# Patient Record
Sex: FEMALE | Race: WHITE | NOT HISPANIC OR LATINO | Employment: UNEMPLOYED | URBAN - METROPOLITAN AREA
[De-identification: names, ages, dates, MRNs, and addresses within clinical notes are randomized per-mention and may not be internally consistent; named-entity substitution may affect disease eponyms.]

---
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
## 2019-01-14 ENCOUNTER — OFFICE VISIT (OUTPATIENT)
Dept: URGENT CARE | Facility: CLINIC | Age: 19
End: 2019-01-14
Payer: COMMERCIAL

## 2019-01-14 VITALS
BODY MASS INDEX: 39.68 KG/M2 | OXYGEN SATURATION: 98 % | RESPIRATION RATE: 20 BRPM | HEART RATE: 89 BPM | HEIGHT: 72 IN | TEMPERATURE: 99 F | WEIGHT: 293 LBS

## 2019-01-14 DIAGNOSIS — K52.9 GASTROENTERITIS: Primary | ICD-10-CM

## 2019-01-14 PROCEDURE — 3008F BODY MASS INDEX DOCD: CPT | Mod: CPTII,S$GLB,, | Performed by: FAMILY MEDICINE

## 2019-01-14 PROCEDURE — 99203 PR OFFICE/OUTPT VISIT, NEW, LEVL III, 30-44 MIN: ICD-10-PCS | Mod: S$GLB,,, | Performed by: FAMILY MEDICINE

## 2019-01-14 PROCEDURE — 99203 OFFICE O/P NEW LOW 30 MIN: CPT | Mod: S$GLB,,, | Performed by: FAMILY MEDICINE

## 2019-01-14 PROCEDURE — 3008F PR BODY MASS INDEX (BMI) DOCUMENTED: ICD-10-PCS | Mod: CPTII,S$GLB,, | Performed by: FAMILY MEDICINE

## 2019-01-14 RX ORDER — CITALOPRAM 20 MG/1
20 TABLET, FILM COATED ORAL DAILY
Refills: 5 | COMMUNITY
Start: 2019-01-10 | End: 2020-02-10 | Stop reason: CLARIF

## 2019-01-14 RX ORDER — DIPHENOXYLATE HYDROCHLORIDE AND ATROPINE SULFATE 2.5; .025 MG/1; MG/1
1 TABLET ORAL 4 TIMES DAILY PRN
Qty: 20 TABLET | Refills: 0 | Status: SHIPPED | OUTPATIENT
Start: 2019-01-14 | End: 2020-02-10 | Stop reason: CLARIF

## 2019-01-14 RX ORDER — PROMETHAZINE HYDROCHLORIDE 25 MG/1
25 TABLET ORAL EVERY 6 HOURS PRN
Qty: 20 TABLET | Refills: 0 | Status: SHIPPED | OUTPATIENT
Start: 2019-01-14 | End: 2020-02-10 | Stop reason: CLARIF

## 2019-01-14 RX ORDER — PROMETHAZINE HYDROCHLORIDE 25 MG/1
25 SUPPOSITORY RECTAL EVERY 6 HOURS PRN
Qty: 5 SUPPOSITORY | Refills: 0 | Status: SHIPPED | OUTPATIENT
Start: 2019-01-14 | End: 2020-02-10 | Stop reason: CLARIF

## 2019-01-14 NOTE — LETTER
January 14, 2019  Pedro Hidalgo  [address]  [place] [address]                [facility] [place]  [address], [address]  [place] [address]  Phone: [phone]  Fax: [phone] Pedro Hidalgo was seen and treated in our Urgent Care department   on 1/14/2019. She may return to work in 2 - 3 days.      If you have any questions or concerns, please don't hesitate to call.    Sincerely,        Juan Pablo Conteh MD

## 2019-01-15 NOTE — PROGRESS NOTES
"Subjective:       Patient ID: Pedro Hidalgo is a 18 y.o. female.    Vitals:  height is 6' 1" (1.854 m) and weight is 164.2 kg (362 lb) (abnormal). Her oral temperature is 98.7 °F (37.1 °C). Her pulse is 89. Her respiration is 20 and oxygen saturation is 98%.     Chief Complaint: Abdominal Pain    Abdominal Pain   This is a new problem. The current episode started yesterday. The onset quality is sudden. The problem occurs constantly. The problem has been gradually worsening. The pain is located in the periumbilical region and suprapubic region. The pain is at a severity of 6/10. The pain is moderate. The quality of the pain is aching. The abdominal pain does not radiate. Associated symptoms include diarrhea, nausea and vomiting. Pertinent negatives include no constipation, dysuria or fever. Nothing aggravates the pain. The pain is relieved by nothing. She has tried nothing for the symptoms. There is no history of abdominal surgery.       Constitution: Negative for appetite change, chills, sweating and fever.   HENT: Negative for trouble swallowing.    Cardiovascular: Negative for chest pain.   Respiratory: Negative for shortness of breath.    Gastrointestinal: Positive for abdominal pain, nausea, vomiting and diarrhea. Negative for abdominal trauma, abdominal bloating, history of abdominal surgery, constipation, dark colored stools and heartburn.   Genitourinary: Negative for dysuria, missed menses and pelvic pain.   Musculoskeletal: Negative for back pain.       Objective:      Physical Exam   Constitutional: She is oriented to person, place, and time. She appears well-developed and well-nourished.   HENT:   Head: Normocephalic and atraumatic.   Right Ear: External ear normal.   Left Ear: External ear normal.   Nose: Nose normal.   Mouth/Throat: Mucous membranes are normal.   Eyes: Conjunctivae and lids are normal.   Neck: Trachea normal and full passive range of motion without pain. Neck supple. "   Cardiovascular: Normal rate, regular rhythm and normal heart sounds.   Pulmonary/Chest: Effort normal and breath sounds normal. No respiratory distress.   Abdominal: Soft. Normal appearance and bowel sounds are normal. She exhibits no distension, no abdominal bruit, no pulsatile midline mass and no mass. There is no tenderness.   Musculoskeletal: Normal range of motion. She exhibits no edema.   Neurological: She is alert and oriented to person, place, and time. She has normal strength.   Skin: Skin is warm, dry and intact. She is not diaphoretic. No pallor.   Psychiatric: She has a normal mood and affect. Her speech is normal and behavior is normal. Judgment and thought content normal. Cognition and memory are normal.   Nursing note and vitals reviewed.      Assessment:       1. Gastroenteritis        Plan:         Gastroenteritis  -     promethazine (PHENERGAN) 25 MG suppository; Place 1 suppository (25 mg total) rectally every 6 (six) hours as needed for Nausea.  Dispense: 5 suppository; Refill: 0  -     promethazine (PHENERGAN) 25 MG tablet; Take 1 tablet (25 mg total) by mouth every 6 (six) hours as needed for Nausea.  Dispense: 20 tablet; Refill: 0  -     diphenoxylate-atropine 2.5-0.025 mg (LOMOTIL) 2.5-0.025 mg per tablet; Take 1 tablet by mouth 4 (four) times daily as needed for Diarrhea.  Dispense: 20 tablet; Refill: 0    Please drink plenty of fluids.  Please get plenty of rest.  Clear liquid diet as instructed for 2 - 3 days or until symptoms improve.  Please return here or go to the Emergency Department for any concerns or worsening of condition.  If you were prescribed antibiotics, please take them to completion.  If not allergic, please take over the counter Tylenol (Acetaminophen) as directed for control of pain and/or fever.  Motrin (advil) or Alleve may help a fever, but they may also worsen your Nausea and Vomiting.    Please follow up with your primary care doctor or specialist as  needed.    Primary Doctor No  None    If you  smoke, please stop smoking.

## 2019-01-15 NOTE — PATIENT INSTRUCTIONS
Please drink plenty of fluids.  Please get plenty of rest.  Clear liquid diet as instructed for 2 - 3 days or until symptoms improve.  Please return here or go to the Emergency Department for any concerns or worsening of condition.  If you were prescribed antibiotics, please take them to completion.  If not allergic, please take over the counter Tylenol (Acetaminophen) as directed for control of pain and/or fever.  Motrin (advil) or Alleve may help a fever, but they may also worsen your Nausea and Vomiting.    Please follow up with your primary care doctor or specialist as needed.    Primary Doctor No  None    If you  smoke, please stop smoking.      Clear Liquid Diet    Clear liquids are any liquid that you can see through. They are also very easy to digest. You may be put on a clear liquid diet if you are recovering from irritation or infection of the stomach or intestinal tract. This diet may also be used before surgery or special procedures such as a colonoscopy. You should not be on this diet for more than 3 days. Below are some clear liquids you can have on this diet.  Adults and children over 2 years old  Adults should drink a total of 2 to 3 quarts of liquid per day. It may be easier to drink small frequent servings rather than a few large ones. Liquids can include:  · Fruit juices. Strained orange juice or lemonade (no pulp); apple, grape, and cranberry juice; clear fruit drinks  · Beverages. Sport drinks, sodas, mineral water (plain or flavored), tea, black coffee, liquid gelatin (add twice the recommended amount of water)  · Soups. Clear broth  · Desserts. Plain gelatin, popsicles, fruit juice bars  Children under 2 years old  Oral rehydration fluids are available at drug stores and most grocery stores. You dont need a prescription.  Date Last Reviewed: 8/1/2016  © 9911-1709 [employer]. [address] [place] [address], [place] [address]. All rights reserved. This information is not intended as a  substitute for professional medical care. Always follow your healthcare professional's instructions.      Viral Gastroenteritis (Adult)    Gastroenteritis is commonly called the stomach flu. It is most often caused by a virus that affects the stomach and intestinal tract and usually lasts from 2 to 7 days. Common viruses causing gastroenteritis include norovirus, rotavirus, and hepatitis A. Non-viral causes of gastroenteritis include bacteria, parasites, and toxins.  The danger from repeated vomiting or diarrhea is dehydration. This is the loss of too much fluid from the body. When this occurs, body fluids must be replaced. Antibiotics do not help with this illness because it is usually viral.Simple home treatment will be helpful.  Symptoms of viral gastroenteritis may include:  · Watery, loose stools  · Stomach pain or abdominal cramps  · Fever and chills  · Nausea and vomiting  · Loss of bowel control  · Headache  Home care  Gastroenteritis is transmitted by contact with the stool or vomit of an infected person. This can occur from person to person or from contact with a contaminated surface.  Follow these guidelines when caring for yourself at home:  · If symptoms are severe, rest at home for the next 24 hours or until you are feeling better.  · Wash your hands with soap and water or use alcohol-based  to prevent the spread of infection. Wash your hands after touching anyone who is sick.  · Wash your hands or use alcohol-based  after using the toilet and before meals. Clean the toilet after each use.  Remember these tips when preparing food:  · People with diarrhea should not prepare or serve food to others. When preparing foods, wash your hands before and after.  · Wash your hands after using cutting boards, countertops, knives, or utensils that have been in contact with raw food.  · Keep uncooked meats away from cooked and ready-to-eat foods.  Medicine  You may use acetaminophen or NSAID  medicines like ibuprofen or naproxen to control fever unless another medicine was given. If you have chronic liver or kidney disease, talk with your healthcare provider before using these medicines. Also talk with your provider if you've had a stomach ulcer or gastrointestinal bleeding. Don't give aspirin to anyone under 18 years of age who is ill with a fever. It may cause severe liver damage. Don't use NSAIDS is you are already taking one for another condition (like arthritis) or are on aspirin (such as for heart disease or after a stroke).  If medicine for vomiting or diarrhea are prescribed, take these only as directed. Do not take over-the-counter medicines for vomiting or diarrhea unless instructed by your healthcare provider.  Diet  Follow these guidelines for food:  · Water and liquids are important so you don't get dehydrated. Drink a small amount at a time or suck on ice chips if you are vomiting.  · If you eat, avoid fatty, greasy, spicy, or fried foods.  · Don't eat dairy if you have diarrhea. This can make diarrhea worse.  · Avoid tobacco, alcohol, and caffeine which may worsen symptoms.  During the first 24 hours (the first full day), follow the diet below:  · Beverages. Sports drinks, soft drinks without caffeine, ginger ale, mineral water (plain or flavored), decaffeinated tea and coffee. If you are very dehydrated, sports drinks aren't a good choice. They have too much sugar and not enough electrolytes. In this case, commercially available products called oral rehydration solutions, are best.  · Soups. Eat clear broth, consommé, and bouillon.  · Desserts. Eat gelatin, popsicles, and fruit juice bars.  During the next 24 hours (the second day), you may add the following to the above:  · Hot cereal, plain toast, bread, rolls, and crackers  · Plain noodles, rice, mashed potatoes, chicken noodle or rice soup  · Unsweetened canned fruit (avoid pineapple), bananas  · Limit fat intake to less than 15 grams  per day. Do this by avoiding margarine, butter, oils, mayonnaise, sauces, gravies, fried foods, peanut butter, meat, poultry, and fish.  · Limit fiber and avoid raw or cooked vegetables, fresh fruits (except bananas), and bran cereals.  · Limit caffeine and chocolate. Don't use spices or seasonings other than salt.  · Limit dairy products.  · Avoid alcohol.  During the next 24 hours:  · Gradually resume a normal diet as you feel better and your symptoms improve.  · If at any time it starts getting worse again, go back to clear liquids until you feel better.  Follow-up care  Follow up with your healthcare provider, or as advised. Call your provider if you don't get better within 24 hours or if diarrhea lasts more than a week. Also follow up if you are unable to keep down liquids and get dehydrated. If a stool (diarrhea) sample was taken, call as directed for the results.  Call 911  Call 911 if any of these occur:  · Trouble breathing  · Chest pain  · Confused  · Severe drowsiness or trouble awakening  · Fainting or loss of consciousness  · Rapid heart rate  · Seizure  · Stiff neck  When to seek medical advice  Call your healthcare provider right away if any of these occur:  · Abdominal pain that gets worse  · Continued vomiting (unable to keep liquids down)  · Frequent diarrhea (more than 5 times a day)  · Blood in vomit or stool (black or red color)  · Dark urine, reduced urine output, or extreme thirst  · Weakness or dizziness  · Drowsiness  · Fever of 100.4°F (38°C) oral or higher that does not get better with fever medicine  · New rash  Date Last Reviewed: 1/3/2016  © 9742-0389 [employer]. [address] [place] [address], [place] [address]. All rights reserved. This information is not intended as a substitute for professional medical care. Always follow your healthcare professional's instructions.

## 2021-09-15 ENCOUNTER — IMMUNIZATION (OUTPATIENT)
Dept: PRIMARY CARE CLINIC | Facility: CLINIC | Age: 21
End: 2021-09-15

## 2021-09-15 DIAGNOSIS — Z23 NEED FOR VACCINATION: Primary | ICD-10-CM

## 2024-02-20 PROBLEM — Z3A.39 39 WEEKS GESTATION OF PREGNANCY: Status: ACTIVE | Noted: 2024-02-20

## 2025-01-27 PROBLEM — Z3A.39 39 WEEKS GESTATION OF PREGNANCY: Status: RESOLVED | Noted: 2024-02-20 | Resolved: 2025-01-27

## 2025-04-23 ENCOUNTER — OFFICE VISIT (OUTPATIENT)
Dept: URGENT CARE | Facility: CLINIC | Age: 25
End: 2025-04-23
Payer: MEDICAID

## 2025-04-23 VITALS
RESPIRATION RATE: 18 BRPM | DIASTOLIC BLOOD PRESSURE: 83 MMHG | OXYGEN SATURATION: 99 % | TEMPERATURE: 99 F | SYSTOLIC BLOOD PRESSURE: 134 MMHG | HEIGHT: 72 IN | BODY MASS INDEX: 39.68 KG/M2 | WEIGHT: 293 LBS | HEART RATE: 92 BPM

## 2025-04-23 DIAGNOSIS — J02.9 ACUTE PHARYNGITIS, UNSPECIFIED ETIOLOGY: ICD-10-CM

## 2025-04-23 DIAGNOSIS — J01.40 ACUTE NON-RECURRENT PANSINUSITIS: ICD-10-CM

## 2025-04-23 DIAGNOSIS — R52 BODY ACHES: Primary | ICD-10-CM

## 2025-04-23 LAB
CTP QC/QA: YES
POC MOLECULAR INFLUENZA A AGN: NEGATIVE
POC MOLECULAR INFLUENZA B AGN: NEGATIVE

## 2025-04-23 PROCEDURE — 87502 INFLUENZA DNA AMP PROBE: CPT | Mod: QW,S$GLB,, | Performed by: FAMILY MEDICINE

## 2025-04-23 PROCEDURE — 99214 OFFICE O/P EST MOD 30 MIN: CPT | Mod: S$GLB,,, | Performed by: FAMILY MEDICINE

## 2025-04-23 RX ORDER — AMPICILLIN 500 MG/1
500 CAPSULE ORAL 4 TIMES DAILY
Qty: 40 CAPSULE | Refills: 0 | Status: SHIPPED | OUTPATIENT
Start: 2025-04-23

## 2025-04-23 NOTE — LETTER
April 23, 2025  Pedro Strong  [address]  [place] [address]                [facility] [place]  [address]  [place] [address]  Phone: [phone]  Fax: [phone] Pedro Strong was seen and treated in our Urgent Care department on 4/23/2025. She may return to work in 2 - 3 days.      If you have any questions or concerns, please don't hesitate to call.        Sincerely,        Juan Pablo Conteh MD

## 2025-04-23 NOTE — PROGRESS NOTES
"Subjective:      Patient ID: Pedro Strong is a 25 y.o. female.    Vitals:  height is 6' 1" (1.854 m) and weight is 173.7 kg (382 lb 15 oz) (abnormal). Her oral temperature is 99.3 °F (37.4 °C). Her blood pressure is 134/83 and her pulse is 92. Her respiration is 18 and oxygen saturation is 99%.     Chief Complaint: Sinus Problem    Pt states her symptoms started 2 days ago. Pt states she was exposed to the flu.    Sinus Problem  This is a new problem. Episode onset: 2 days. The problem has been gradually worsening since onset. There has been no fever. Her pain is at a severity of 5/10. The pain is moderate. Associated symptoms include headaches and sneezing. Pertinent negatives include no congestion, coughing, ear pain or sore throat. (Fatigue, weakness, body aches ) Past treatments include nothing. The treatment provided no relief.       Constitution: Negative.   HENT:  Negative for ear pain, congestion and sore throat.    Cardiovascular: Negative.    Eyes: Negative.    Respiratory: Negative.  Negative for cough.    Gastrointestinal: Negative.    Endocrine: negative.   Genitourinary: Negative.    Musculoskeletal: Negative.    Skin: Negative.    Allergic/Immunologic: Negative.  Positive for sneezing.   Neurological:  Positive for headaches.   Hematologic/Lymphatic: Negative.    Psychiatric/Behavioral: Negative.        Objective:     Physical Exam   Constitutional: She is oriented to person, place, and time. She appears well-developed. She is cooperative.  Non-toxic appearance. She does not appear ill. No distress.   HENT:   Head: Normocephalic and atraumatic.   Ears:   Right Ear: Hearing, tympanic membrane, external ear and ear canal normal.   Left Ear: Hearing, tympanic membrane, external ear and ear canal normal.   Nose: No mucosal edema, rhinorrhea or nasal deformity. No epistaxis. Right sinus exhibits maxillary sinus tenderness and frontal sinus tenderness. Left sinus exhibits maxillary sinus tenderness " and frontal sinus tenderness.   Mouth/Throat: Uvula is midline and mucous membranes are normal. No trismus in the jaw. Normal dentition. No uvula swelling. Posterior oropharyngeal edema and posterior oropharyngeal erythema present. No oropharyngeal exudate.   Eyes: Conjunctivae and lids are normal. No scleral icterus.   Neck: Trachea normal and phonation normal. Neck supple. No edema present. No erythema present. No neck rigidity present.   Cardiovascular: Normal rate, regular rhythm, normal heart sounds and normal pulses.   Pulmonary/Chest: Effort normal and breath sounds normal. No respiratory distress. She has no decreased breath sounds. She has no rhonchi.   Abdominal: Normal appearance.   Musculoskeletal: Normal range of motion.         General: No deformity or edema. Normal range of motion.   Neurological: She is alert and oriented to person, place, and time. She exhibits normal muscle tone. Coordination normal.   Skin: Skin is warm, dry, intact, not diaphoretic and not pale.   Psychiatric: Her speech is normal and behavior is normal. Judgment and thought content normal.   Nursing note and vitals reviewed.    Results for orders placed or performed in visit on 04/23/25   POCT Influenza A/B MOLECULAR    Collection Time: 04/23/25  6:40 PM   Result Value Ref Range    POC Molecular Influenza A Ag Negative Negative    POC Molecular Influenza B Ag Negative Negative     Acceptable Yes          Assessment:     1. Body aches    2. Acute pharyngitis, unspecified etiology    3. Acute non-recurrent pansinusitis        Plan:       Body aches  -     POCT Influenza A/B MOLECULAR    Acute pharyngitis, unspecified etiology    Acute non-recurrent pansinusitis  -     ampicillin (PRINCIPEN) 500 MG capsule; Take 1 capsule (500 mg total) by mouth 4 (four) times daily.  Dispense: 40 capsule; Refill: 0      Please drink plenty of fluids.  Please get plenty of rest.  Please return here or go to the Emergency Department  for any concerns or worsening of condition.  If you were given wait & see antibiotics, please wait 3-5 days before taking them, and only take them if your symptoms have worsened or not improved.  If you do begin taking the antibiotics, please take them to completion.  If you were prescribed antibiotics, please take them to completion.    Nasal irrigation with a saline spray or Netti Pot like device per their directions is also recommended.  If not allergic, please take over the counter Tylenol (Acetaminophen)  as directed for control of pain and/or fever.  You may take Sudafed every 6 hours as directed for congestion.    Please follow up with your primary care doctor or specialist as needed.    Rodolfo Buckner, JOSE GAUDALUPE  [phone]    If you  smoke, please stop smoking.       You must understand that you have received treatment at an Urgent Care facility only, and that you may be  released before all of your medical problems are known or treated. Urgent Care facilities are not equipped to  handle life threatening emergencies. It is recommended that you seek care at an Emergency Department for  further evaluation of worsening or concerning symptoms, or possibly life threatening conditions as  discussed.

## 2025-04-23 NOTE — PATIENT INSTRUCTIONS
Please drink plenty of fluids.  Please get plenty of rest.  Please return here or go to the Emergency Department for any concerns or worsening of condition.  If you were given wait & see antibiotics, please wait 3-5 days before taking them, and only take them if your symptoms have worsened or not improved.  If you do begin taking the antibiotics, please take them to completion.  If you were prescribed antibiotics, please take them to completion.    Nasal irrigation with a saline spray or Netti Pot like device per their directions is also recommended.  If not allergic, please take over the counter Tylenol (Acetaminophen)  as directed for control of pain and/or fever.  You may take Sudafed every 6 hours as directed for congestion.    Please follow up with your primary care doctor or specialist as needed.    Rodolfo Buckner, JOSE GUADALUPE  [phone]    If you  smoke, please stop smoking.       You must understand that you have received treatment at an Urgent Care facility only, and that you may be  released before all of your medical problems are known or treated. Urgent Care facilities are not equipped to  handle life threatening emergencies. It is recommended that you seek care at an Emergency Department for  further evaluation of worsening or concerning symptoms, or possibly life threatening conditions as  discussed.    Pharyngitis: Strep (Presumed)    You have pharyngitis (sore throat). The cause is thought to be the streptococcus, or strep, bacterium. Strep throat infection can cause throat pain that is worse when swallowing, aching all over, headache, and fever. The infection may be spread by coughing, kissing, or touching others after touching your mouth or nose. Antibiotic medications are given to treat the infection.  Home care  Rest at home. Drink plenty of fluids to avoid dehydration.  No work or school for the first 2 days of taking the antibiotics. After this time, you will not be contagious. You can then return  to work or school if you are feeling better.   The antibiotic medication must be taken for the full 10 days, even if you feel better. This is very important to ensure the infection is treated. It is also important to prevent drug-resistant organisms from developing. If you were given an antibiotic shot, no more antibiotics are needed.  You may use acetaminophen or ibuprofen to control pain or fever, unless another medicine was prescribed for this. If you have chronic liver or kidney disease or ever had a stomach ulcer or GI bleeding, talk with your doctor before using these medicines.  Throat lozenges or a throat-numbing sprays can help reduce throat pain. Gargling with warm salt water can also help. Dissolve 1/2 teaspoon of salt in 1 8 ounce glass of warm water.   Avoid salty or spicy foods, which can irritate the throat.  Follow-up care  Follow up with your healthcare provider or our staff if you are not improving over the next week.  When to seek medical advice  Call your healthcare provider right away if any of these occur:  Fever as directed by your doctor.   New or worsening ear pain, sinus pain, or headache  Painful lumps in the back of neck  Stiff neck  Lymph nodes are getting larger  Inability to swallow liquids, excessive drooling, or inability to open mouth wide due to throat pain  Signs of dehydration (very dark urine or no urine, sunken eyes, dizziness)  Trouble breathing or noisy breathing  Muffled voice  New rash  Date Last Reviewed: 4/13/2015  © 0962-3917 [employer]. [address] [place] [address], [place] [address]. All rights reserved. This information is not intended as a substitute for professional medical care. Always follow your healthcare professional's instructions.